# Patient Record
Sex: MALE | Race: WHITE | Employment: FULL TIME | URBAN - METROPOLITAN AREA
[De-identification: names, ages, dates, MRNs, and addresses within clinical notes are randomized per-mention and may not be internally consistent; named-entity substitution may affect disease eponyms.]

---

## 2017-03-29 ENCOUNTER — LAB REQUISITION (OUTPATIENT)
Dept: LAB | Facility: HOSPITAL | Age: 52
End: 2017-03-29
Payer: COMMERCIAL

## 2017-03-29 DIAGNOSIS — M72.2 PLANTAR FASCIAL FIBROMATOSIS: ICD-10-CM

## 2017-03-29 DIAGNOSIS — M79.2 NEURALGIA AND NEURITIS, UNSPECIFIED: ICD-10-CM

## 2017-03-29 PROCEDURE — 88305 TISSUE EXAM BY PATHOLOGIST: CPT | Performed by: PODIATRIST

## 2017-11-28 ENCOUNTER — OFFICE VISIT (OUTPATIENT)
Dept: FAMILY MEDICINE CLINIC | Facility: CLINIC | Age: 52
End: 2017-11-28

## 2017-11-28 VITALS
WEIGHT: 236.63 LBS | OXYGEN SATURATION: 98 % | BODY MASS INDEX: 31.7 KG/M2 | HEART RATE: 89 BPM | HEIGHT: 72.5 IN | TEMPERATURE: 98 F | RESPIRATION RATE: 16 BRPM | DIASTOLIC BLOOD PRESSURE: 78 MMHG | SYSTOLIC BLOOD PRESSURE: 124 MMHG

## 2017-11-28 DIAGNOSIS — J01.00 ACUTE MAXILLARY SINUSITIS, RECURRENCE NOT SPECIFIED: Primary | ICD-10-CM

## 2017-11-28 PROCEDURE — 99213 OFFICE O/P EST LOW 20 MIN: CPT | Performed by: NURSE PRACTITIONER

## 2017-11-28 RX ORDER — FLUTICASONE PROPIONATE 50 MCG
2 SPRAY, SUSPENSION (ML) NASAL DAILY
Qty: 1 BOTTLE | Refills: 3 | Status: SHIPPED | OUTPATIENT
Start: 2017-11-28 | End: 2018-11-23

## 2017-11-28 RX ORDER — CEFDINIR 300 MG/1
300 CAPSULE ORAL 2 TIMES DAILY
Qty: 20 CAPSULE | Refills: 0 | Status: SHIPPED | OUTPATIENT
Start: 2017-11-28 | End: 2017-12-08

## 2017-11-28 NOTE — PROGRESS NOTES
Daniella Ji is a 45year old male.    Patient presents with:  Sinus Problem: chest congestion and sinus pressure sx x 2 weeks     HPI:    Symptoms started 2 weeks ago with purulent nasal discharge, maxillary sinus pressure, and headache, a lot of  post Refills    cefdinir 300 MG Oral Cap 20 capsule 0      Sig: Take 1 capsule (300 mg total) by mouth 2 (two) times daily. Fluticasone Propionate 50 MCG/ACT Nasal Suspension 1 Bottle 3      Si sprays by Nasal route daily.            Imaging & Consults:

## 2022-08-18 ENCOUNTER — OFFICE VISIT (OUTPATIENT)
Dept: FAMILY MEDICINE CLINIC | Facility: CLINIC | Age: 57
End: 2022-08-18
Payer: COMMERCIAL

## 2022-08-18 VITALS
OXYGEN SATURATION: 98 % | SYSTOLIC BLOOD PRESSURE: 151 MMHG | TEMPERATURE: 98 F | RESPIRATION RATE: 18 BRPM | HEIGHT: 73.5 IN | WEIGHT: 235 LBS | HEART RATE: 79 BPM | DIASTOLIC BLOOD PRESSURE: 79 MMHG | BODY MASS INDEX: 30.48 KG/M2

## 2022-08-18 DIAGNOSIS — J06.9 VIRAL UPPER RESPIRATORY ILLNESS: Primary | ICD-10-CM

## 2022-08-18 PROCEDURE — 3008F BODY MASS INDEX DOCD: CPT | Performed by: NURSE PRACTITIONER

## 2022-08-18 PROCEDURE — 99202 OFFICE O/P NEW SF 15 MIN: CPT | Performed by: NURSE PRACTITIONER

## 2022-08-18 PROCEDURE — 3078F DIAST BP <80 MM HG: CPT | Performed by: NURSE PRACTITIONER

## 2022-08-18 PROCEDURE — 3077F SYST BP >= 140 MM HG: CPT | Performed by: NURSE PRACTITIONER

## 2022-08-18 RX ORDER — TERBINAFINE HYDROCHLORIDE 250 MG/1
250 TABLET ORAL DAILY
COMMUNITY
Start: 2022-07-25

## 2022-08-18 RX ORDER — TADALAFIL 5 MG/1
1 TABLET ORAL
COMMUNITY
Start: 2021-09-15

## 2022-08-19 LAB — SARS-COV-2 RNA RESP QL NAA+PROBE: NOT DETECTED

## 2024-09-12 ENCOUNTER — OFFICE VISIT (OUTPATIENT)
Dept: FAMILY MEDICINE CLINIC | Facility: CLINIC | Age: 59
End: 2024-09-12
Payer: COMMERCIAL

## 2024-09-12 VITALS
RESPIRATION RATE: 16 BRPM | SYSTOLIC BLOOD PRESSURE: 122 MMHG | DIASTOLIC BLOOD PRESSURE: 64 MMHG | TEMPERATURE: 98 F | OXYGEN SATURATION: 97 % | WEIGHT: 250 LBS | HEART RATE: 70 BPM | BODY MASS INDEX: 32.43 KG/M2 | HEIGHT: 73.5 IN

## 2024-09-12 DIAGNOSIS — R05.2 SUBACUTE COUGH: ICD-10-CM

## 2024-09-12 DIAGNOSIS — J01.40 ACUTE NON-RECURRENT PANSINUSITIS: Primary | ICD-10-CM

## 2024-09-12 DIAGNOSIS — R09.82 PND (POST-NASAL DRIP): ICD-10-CM

## 2024-09-12 PROCEDURE — 99213 OFFICE O/P EST LOW 20 MIN: CPT | Performed by: NURSE PRACTITIONER

## 2024-09-12 RX ORDER — DOXYCYCLINE HYCLATE 100 MG
100 TABLET ORAL 2 TIMES DAILY
Qty: 14 TABLET | Refills: 0 | Status: SHIPPED | OUTPATIENT
Start: 2024-09-12 | End: 2024-09-19

## 2024-09-12 NOTE — PROGRESS NOTES
Subjective:   Patient ID: Jeffy Hoff is a 58 year old male.    Patient is a 58 year old male who presents today with complaints of nasal congestion, cough, sinus pressure, pnd and throat clearing x 3 weeks. Sinus pressure/nasal congestion worsening the past 1-2 days. Denies runny nose, SOB, chest pain, wheezing, fevers, body aches, chills, ear pain, sore throat, n/v/d or abdominal pain. Tolerating PO well at home. Attempted treatment prior to arrival = Dayquil and Nyquil. Wife with similar symptoms.        History/Other:   Review of Systems   Constitutional:  Negative for appetite change, chills and fever.   HENT:  Positive for congestion, postnasal drip and sinus pressure. Negative for ear pain and sore throat.    Respiratory:  Positive for cough. Negative for shortness of breath and wheezing.    Cardiovascular:  Negative for chest pain.   Gastrointestinal:  Negative for abdominal pain, diarrhea, nausea and vomiting.   Musculoskeletal:  Negative for myalgias.     Current Outpatient Medications   Medication Sig Dispense Refill    Doxycycline Hyclate 100 MG Oral Tab Take 1 tablet (100 mg total) by mouth 2 (two) times daily for 7 days. 14 tablet 0    tadalafil 5 MG Oral Tab Take 1 tablet by mouth daily as needed.      terbinafine 250 MG Oral Tab Take 250 mg by mouth daily.      MetroNIDAZOLE (METROGEL) 1 % External Gel Apply to AAs of face 1-2 times daily. 60 g 3     Allergies:  Allergies   Allergen Reactions    Pcn [Penicillins]      /64   Pulse 70   Temp 97.9 °F (36.6 °C)   Resp 16   Ht 6' 1.5\" (1.867 m)   Wt 250 lb (113.4 kg)   SpO2 97%   BMI 32.54 kg/m²     Objective:   Physical Exam  Vitals reviewed.   Constitutional:       General: He is awake. He is not in acute distress.     Appearance: Normal appearance. He is well-developed and well-groomed. He is not ill-appearing, toxic-appearing or diaphoretic.   HENT:      Head: Normocephalic and atraumatic.      Right Ear: Tympanic membrane, ear canal  and external ear normal.      Left Ear: Tympanic membrane, ear canal and external ear normal.      Nose:      Right Turbinates: Swollen.      Left Turbinates: Swollen.      Right Sinus: Maxillary sinus tenderness and frontal sinus tenderness present.      Left Sinus: Maxillary sinus tenderness and frontal sinus tenderness present.   Cardiovascular:      Rate and Rhythm: Normal rate and regular rhythm.   Pulmonary:      Effort: Pulmonary effort is normal. No respiratory distress.      Breath sounds: Normal breath sounds and air entry. No decreased breath sounds, wheezing, rhonchi or rales.   Lymphadenopathy:      Cervical: No cervical adenopathy.   Skin:     General: Skin is warm and dry.   Neurological:      Mental Status: He is alert and oriented to person, place, and time.   Psychiatric:         Behavior: Behavior is cooperative.         Assessment & Plan:   1. Acute non-recurrent pansinusitis    2. Subacute cough    3. PND (post-nasal drip)        No orders of the defined types were placed in this encounter.      Meds This Visit:  Requested Prescriptions     Signed Prescriptions Disp Refills    Doxycycline Hyclate 100 MG Oral Tab 14 tablet 0     Sig: Take 1 tablet (100 mg total) by mouth 2 (two) times daily for 7 days.     Reassuring physical exam findings. Vitals WNL. No sign of RDS or dehydration at this time.  Due to HPI, duration of symptoms and clinical exam findings, start Doxycycline today to cover for sinusitis.  Supportive care and return to care measures reviewed.  Patient v/u and is comfortable with this plan.    Patient Instructions   Tylenol and Motrin as needed  Rest, push fluids  Mucinex DM over the counter for nasal congestion/cough  START daily antihistamine (Zyrtec, Claritin, Allegra) and choose one of those. Take daily for 1-2 weeks to help with any post nasal drainage/sore throat  START Flonase nasal spray daily. 1 spray in each nostril  Take Doxycycline (antibiotic) as prescribed, finish all  7 days  Return to care for new/worsening symptoms

## 2024-09-12 NOTE — PATIENT INSTRUCTIONS
Tylenol and Motrin as needed  Rest, push fluids  Mucinex DM over the counter for nasal congestion/cough  START daily antihistamine (Zyrtec, Claritin, Allegra) and choose one of those. Take daily for 1-2 weeks to help with any post nasal drainage/sore throat  START Flonase nasal spray daily. 1 spray in each nostril  Take Doxycycline (antibiotic) as prescribed, finish all 7 days  Return to care for new/worsening symptoms